# Patient Record
Sex: MALE | Race: WHITE
[De-identification: names, ages, dates, MRNs, and addresses within clinical notes are randomized per-mention and may not be internally consistent; named-entity substitution may affect disease eponyms.]

---

## 2017-11-27 ENCOUNTER — HOSPITAL ENCOUNTER (INPATIENT)
Dept: HOSPITAL 61 - ER | Age: 32
LOS: 2 days | Discharge: HOME | DRG: 872 | End: 2017-11-29
Attending: INTERNAL MEDICINE | Admitting: INTERNAL MEDICINE
Payer: SELF-PAY

## 2017-11-27 VITALS — BODY MASS INDEX: 25.11 KG/M2 | WEIGHT: 169.56 LBS | HEIGHT: 69 IN

## 2017-11-27 DIAGNOSIS — E87.6: ICD-10-CM

## 2017-11-27 DIAGNOSIS — A41.9: Primary | ICD-10-CM

## 2017-11-27 DIAGNOSIS — Z87.891: ICD-10-CM

## 2017-11-27 DIAGNOSIS — K35.80: ICD-10-CM

## 2017-11-27 LAB
ANION GAP SERPL CALC-SCNC: 15 MMOL/L (ref 6–14)
BASOPHILS # BLD AUTO: 0.1 X10^3/UL (ref 0–0.2)
BASOPHILS NFR BLD: 0 % (ref 0–3)
BUN SERPL-MCNC: 13 MG/DL (ref 8–26)
CALCIUM SERPL-MCNC: 9.4 MG/DL (ref 8.5–10.1)
CHLORIDE SERPL-SCNC: 100 MMOL/L (ref 98–107)
CO2 SERPL-SCNC: 24 MMOL/L (ref 21–32)
CREAT SERPL-MCNC: 1.3 MG/DL (ref 0.7–1.3)
EOSINOPHIL NFR BLD AUTO: 1 % (ref 0–5)
EOSINOPHIL NFR BLD: 1 % (ref 0–3)
ERYTHROCYTE [DISTWIDTH] IN BLOOD BY AUTOMATED COUNT: 13.6 % (ref 11.5–14.5)
GFR SERPLBLD BASED ON 1.73 SQ M-ARVRAT: 64 ML/MIN
GLUCOSE SERPL-MCNC: 151 MG/DL (ref 70–99)
HCT VFR BLD CALC: 45 % (ref 39–53)
HGB BLD-MCNC: 14.5 G/DL (ref 13–17.5)
LYMPHOCYTES # BLD: 4.2 X10^3/UL (ref 1–4.8)
LYMPHOCYTES NFR BLD AUTO: 17 % (ref 24–48)
MCH RBC QN AUTO: 28 PG (ref 25–35)
MCHC RBC AUTO-ENTMCNC: 32 G/DL (ref 31–37)
MCV RBC AUTO: 88 FL (ref 79–100)
MONOCYTES NFR BLD: 8 % (ref 0–9)
NEUTROPHILS NFR BLD AUTO: 75 % (ref 31–73)
PLATELET # BLD AUTO: 333 X10^3/UL (ref 140–400)
PLATELET # BLD EST: ADEQUATE 10*3/UL
POTASSIUM SERPL-SCNC: 3.1 MMOL/L (ref 3.5–5.1)
RBC # BLD AUTO: 5.12 X10^6/UL (ref 4.3–5.7)
SODIUM SERPL-SCNC: 139 MMOL/L (ref 136–145)
WBC # BLD AUTO: 24.9 X10^3/UL (ref 4–11)

## 2017-11-27 PROCEDURE — S0028 INJECTION, FAMOTIDINE, 20 MG: HCPCS

## 2017-11-27 PROCEDURE — 85007 BL SMEAR W/DIFF WBC COUNT: CPT

## 2017-11-27 PROCEDURE — 36415 COLL VENOUS BLD VENIPUNCTURE: CPT

## 2017-11-27 PROCEDURE — 81001 URINALYSIS AUTO W/SCOPE: CPT

## 2017-11-27 PROCEDURE — 96375 TX/PRO/DX INJ NEW DRUG ADDON: CPT

## 2017-11-27 PROCEDURE — 85025 COMPLETE CBC W/AUTO DIFF WBC: CPT

## 2017-11-27 PROCEDURE — 80076 HEPATIC FUNCTION PANEL: CPT

## 2017-11-27 PROCEDURE — 83690 ASSAY OF LIPASE: CPT

## 2017-11-27 PROCEDURE — 76705 ECHO EXAM OF ABDOMEN: CPT

## 2017-11-27 PROCEDURE — 80048 BASIC METABOLIC PNL TOTAL CA: CPT

## 2017-11-27 PROCEDURE — 96376 TX/PRO/DX INJ SAME DRUG ADON: CPT

## 2017-11-27 PROCEDURE — 83735 ASSAY OF MAGNESIUM: CPT

## 2017-11-27 PROCEDURE — 96361 HYDRATE IV INFUSION ADD-ON: CPT

## 2017-11-27 PROCEDURE — 74177 CT ABD & PELVIS W/CONTRAST: CPT

## 2017-11-27 PROCEDURE — 96374 THER/PROPH/DIAG INJ IV PUSH: CPT

## 2017-11-27 NOTE — PHYS DOC
Past Medical History


Past Medical History:  No Pertinent History


Past Surgical History:  No Surgical History


Smoking:  Cigarettes


Alcohol Use:  None





Adult General


Chief Complaint


Chief Complaint:  ABDOMINAL PAIN





HPI


HPI





32-year-old otherwise healthy male presenting to the emergency department today 

with nausea vomiting and epigastric abdominal pain. His been present for the 

past 24-48 hours. His vomitus is stomach contents. It is nonbilious and 

nonbloody. He denies fevers or chills. He denies it migrating pain. He denies 

diarrhea or constipation. The pain is mild intermittent and without alleviating 

factors.





Review of systems is negative for chest pain shortness of breath cough fevers 

chills headache neck stiffness confusion. All other review of systems is 

negative unless otherwise noted in history of present illness.





ED course: 32-year-old male presenting with epigastric abdominal pain. Upon 

arrival the patient's heart rate is normal. He is afebrile. On examination of 

the patient he has a soft nontender abdomen. Negative Henderson sign. The patient 

did vomit in the emergency department which was nonbloody. IV fluids yesterday 

along with nausea and pain medication. Blood work obtained. Both work shows 

significant leukocytosis. Ultrasound of the right upper quadrant negative. CT 

the abdomen pelvis obtained which concerning for possible early appendicitis. 

Patient was placed nothing by mouth, IV fluids administered along with abx, 

surgical consult placed and the patient was admitted to our hospital for 

further evaluation workup and care to Dr. schwarz. I have assessed this patient 

clinically and believe that their condition requires an admission to the 

hospital.  After consulting the admitting physician about this case, they have 

asked that I admit this patient to their service as an inpatient based on the 

clinical presentation and my impression.





Review of Systems


Review of Systems


SEE ABOVE.





Current Medications


Current Medications





Current Medications








 Medications


  (Trade)  Dose


 Ordered  Sig/Perla  Start Time


 Stop Time Status Last Admin


Dose Admin


 


 Famotidine


  (Pepcid Vial)  20 mg  1X  ONCE  11/27/17 23:45


 11/27/17 23:46 DC 11/27/17 23:46


20 MG


 


 Hydromorphone HCl


  (Dilaudid)  0.5 mg  PRN Q30MIN  PRN  11/28/17 01:00


    11/28/17 01:22


0.5 MG


 


 Info


  (Do NOT chart on


 this entry -- for


 MONITORING)  1 each  PRN DAILY  PRN  11/28/17 01:00


 11/30/17 00:59   


 


 


 Iohexol


  (Omnipaque 300


 Mg/ml)  75 ml  1X  ONCE  11/28/17 01:30


 11/28/17 01:31 DC 11/28/17 01:07


75 ML


 


 Metoclopramide HCl


  (Reglan Vial)  10 mg  1X  ONCE  11/28/17 01:30


 11/28/17 01:31 DC 11/28/17 01:21


10 MG


 


 Ondansetron HCl


  (Zofran)  4 mg  1X  ONCE  11/27/17 23:45


 11/27/17 23:46 DC 11/27/17 23:46


4 MG


 


 Sodium Chloride  1,000 ml @ 


 1,000 mls/hr  1X  ONCE  11/27/17 23:45


 11/28/17 00:44 DC 11/27/17 23:46


1,000 MLS/HR











Allergies


Allergies





Allergies








Coded Allergies Type Severity Reaction Last Updated Verified


 


  No Known Drug Allergies    11/27/17 No











Physical Exam


Physical Exam


SEE ABOVE





Constitutional: Well developed, well nourished, no acute distress, non-toxic 

appearance. []


HENT: Normocephalic, atraumatic, bilateral external ears normal, oropharynx 

moist, no oral exudates, nose normal. 


Eyes: PERRLA, EOMI, conjunctiva normal, no discharge. [] 


Neck: Normal range of motion, no tenderness, supple, no stridor. [] 


Cardiovascular:Heart rate regular rhythm, no murmur 


Lungs & Thorax:  Bilateral breath sounds clear to auscultation []


Abdomen: SEE ABOVE


Skin: Warm, dry, no erythema, no rash. [] 


Back: No tenderness, no CVA tenderness. [] 


Extremities: No tenderness, no cyanosis, no clubbing, ROM intact, no edema.  


Neurologic: Alert and oriented X 3, normal motor function, normal sensory 

function, no focal deficits noted. []


Psychologic: Affect normal, judgement normal, mood normal.





Current Patient Data


Vital Signs





 Vital Signs








  Date Time  Temp Pulse Resp B/P (MAP) Pulse Ox O2 Delivery O2 Flow Rate FiO2


 


11/28/17 02:20  57 20 140/73 (95) 99 Room Air  


 


11/27/17 23:04 97.6       





 97.6       








Lab Values





 Laboratory Tests








Test


  11/27/17


23:15 11/27/17


23:35 11/28/17


00:45


 


White Blood Count


  24.9 x10^3/uL


(4.0-11.0)  H 


  


 


 


Red Blood Count


  5.12 x10^6/uL


(4.30-5.70) 


  


 


 


Hemoglobin


  14.5 g/dL


(13.0-17.5) 


  


 


 


Hematocrit


  45.0 %


(39.0-53.0) 


  


 


 


Mean Corpuscular Volume


  88 fL ()


  


  


 


 


Mean Corpuscular Hemoglobin 28 pg (25-35)    


 


Mean Corpuscular Hemoglobin


Concent 32 g/dL


(31-37) 


  


 


 


Red Cell Distribution Width


  13.6 %


(11.5-14.5) 


  


 


 


Platelet Count


  333 x10^3/uL


(140-400) 


  


 


 


Neutrophils (%) (Auto) 75 % (31-73)  H  


 


Lymphocytes (%) (Auto) 17 % (24-48)  L  


 


Monocytes (%) (Auto) 8 % (0-9)    


 


Eosinophils (%) (Auto) 1 % (0-3)    


 


Basophils (%) (Auto) 0 % (0-3)    


 


Neutrophils # (Auto)


  18.6 x10^3uL


(1.8-7.7)  H 


  


 


 


Lymphocytes # (Auto)


  4.2 x10^3/uL


(1.0-4.8) 


  


 


 


Monocytes # (Auto)


  2.0 x10^3/uL


(0.0-1.1)  H 


  


 


 


Eosinophils # (Auto)


  0.1 x10^3/uL


(0.0-0.7) 


  


 


 


Basophils # (Auto)


  0.1 x10^3/uL


(0.0-0.2) 


  


 


 


Segmented Neutrophils % 78 % (35-66)  H  


 


Band Neutrophils % 1 % (0-9)    


 


Lymphocytes % 14 % (24-48)  L  


 


Monocytes % 6 % (0-10)    


 


Eosinophils % 1 % (0-5)    


 


Platelet Estimate


  Adequate


(ADEQUATE) 


  


 


 


Sodium Level


  


  139 mmol/L


(136-145) 


 


 


Potassium Level


  


  3.1 mmol/L


(3.5-5.1)  L 


 


 


Chloride Level


  


  100 mmol/L


() 


 


 


Carbon Dioxide Level


  


  24 mmol/L


(21-32) 


 


 


Anion Gap  15 (6-14)  H 


 


Blood Urea Nitrogen


  


  13 mg/dL


(8-26) 


 


 


Creatinine


  


  1.3 mg/dL


(0.7-1.3) 


 


 


Estimated GFR


(Cockcroft-Gault) 


  64.0  


  


 


 


Glucose Level


  


  151 mg/dL


(70-99)  H 


 


 


Calcium Level


  


  9.4 mg/dL


(8.5-10.1) 


 


 


Total Bilirubin


  


  0.2 mg/dL


(0.2-1.0) 


 


 


Direct Bilirubin


  


  0.1 mg/dL


(0.0-0.2) 


 


 


Aspartate Amino Transferase


(AST) 


  23 U/L (15-37)


  


 


 


Alanine Aminotransferase (ALT)


  


  40 U/L (16-63)


  


 


 


Alkaline Phosphatase


  


  60 U/L


() 


 


 


Total Protein


  


  7.9 g/dL


(6.4-8.2) 


 


 


Albumin


  


  4.2 g/dL


(3.4-5.0) 


 


 


Lipase


  


  109 U/L


() 


 


 


Urine Collection Type   Unknown  


 


Urine Color   Yellow  


 


Urine Clarity   Clear  


 


Urine pH   8.5  


 


Urine Specific Gravity   1.020  


 


Urine Protein


  


  


  Negative mg/dL


(NEG-TRACE)


 


Urine Glucose (UA)


  


  


  Negative mg/dL


(NEG)


 


Urine Ketones (Stick)


  


  


  Trace mg/dL


(NEG)


 


Urine Blood


  


  


  Negative (NEG)


 


 


Urine Nitrite


  


  


  Negative (NEG)


 


 


Urine Bilirubin


  


  


  Negative (NEG)


 


 


Urine Urobilinogen Dipstick


  


  


  0.2 mg/dL (0.2


mg/dL)


 


Urine Leukocyte Esterase


  


  


  Negative (NEG)


 


 


Urine RBC


  


  


  Occ /HPF (0-2)


 


 


Urine WBC


  


  


  Occ /HPF (0-4)


 


 


Urine Squamous Epithelial


Cells 


  


  Occ /LPF  


 


 


Urine Bacteria


  


  


  0 /HPF (0-FEW)


 


 


Urine Mucus   Slight /LPF  





 Laboratory Tests


11/27/17 23:15








 Laboratory Tests


11/27/17 23:35











EKG


EKG


[]





Radiology/Procedures


Radiology/Procedures


[]





Course & Med Decision Making


Course & Med Decision Making


Pertinent Labs and Imaging studies reviewed. (See chart for details)





[]





Dragon Disclaimer


Dragon Disclaimer


This electronic medical record was generated, in whole or in part, using a 

voice recognition dictation system.





Departure


Departure


Impression:  


 Primary Impression:  


 Abdominal pain


 Additional Impression:  


 Appendicitis


Disposition:  09 ADMITTED AS INPATIENT


Admitting Physician:  Cate Schwarz


Condition:  STABLE


Referrals:  


NO PCP (PCP)





Problem Qualifiers











CLEO CARIAS MD Nov 27, 2017 23:29

## 2017-11-28 VITALS — DIASTOLIC BLOOD PRESSURE: 62 MMHG | SYSTOLIC BLOOD PRESSURE: 111 MMHG

## 2017-11-28 VITALS — DIASTOLIC BLOOD PRESSURE: 34 MMHG | SYSTOLIC BLOOD PRESSURE: 107 MMHG

## 2017-11-28 VITALS — DIASTOLIC BLOOD PRESSURE: 64 MMHG | SYSTOLIC BLOOD PRESSURE: 110 MMHG

## 2017-11-28 VITALS — DIASTOLIC BLOOD PRESSURE: 62 MMHG | SYSTOLIC BLOOD PRESSURE: 114 MMHG

## 2017-11-28 VITALS — SYSTOLIC BLOOD PRESSURE: 104 MMHG | DIASTOLIC BLOOD PRESSURE: 60 MMHG

## 2017-11-28 VITALS — SYSTOLIC BLOOD PRESSURE: 131 MMHG | DIASTOLIC BLOOD PRESSURE: 70 MMHG

## 2017-11-28 LAB
ALBUMIN SERPL-MCNC: 4.2 G/DL (ref 3.4–5)
ALP SERPL-CCNC: 60 U/L (ref 46–116)
ALT SERPL-CCNC: 40 U/L (ref 16–63)
AST SERPL-CCNC: 23 U/L (ref 15–37)
BACTERIA #/AREA URNS HPF: 0 /HPF
BASOPHILS # BLD AUTO: 0.1 X10^3/UL (ref 0–0.2)
BASOPHILS NFR BLD: 0 % (ref 0–3)
BILIRUB DIRECT SERPL-MCNC: 0.1 MG/DL (ref 0–0.2)
BILIRUB SERPL-MCNC: 0.2 MG/DL (ref 0.2–1)
BILIRUB UR QL STRIP: NEGATIVE
EOSINOPHIL NFR BLD: 0 % (ref 0–3)
ERYTHROCYTE [DISTWIDTH] IN BLOOD BY AUTOMATED COUNT: 13.8 % (ref 11.5–14.5)
GLUCOSE UR STRIP-MCNC: NEGATIVE MG/DL
HCT VFR BLD CALC: 41.7 % (ref 39–53)
HGB BLD-MCNC: 13.5 G/DL (ref 13–17.5)
LYMPHOCYTES # BLD: 1.9 X10^3/UL (ref 1–4.8)
LYMPHOCYTES NFR BLD AUTO: 8 % (ref 24–48)
MCH RBC QN AUTO: 28 PG (ref 25–35)
MCHC RBC AUTO-ENTMCNC: 32 G/DL (ref 31–37)
MCV RBC AUTO: 88 FL (ref 79–100)
MONOCYTES NFR BLD: 7 % (ref 0–9)
NEUTROPHILS NFR BLD AUTO: 84 % (ref 31–73)
NITRITE UR QL STRIP: NEGATIVE
PH UR STRIP: 8.5 [PH]
PLATELET # BLD AUTO: 312 X10^3/UL (ref 140–400)
PROT SERPL-MCNC: 7.9 G/DL (ref 6.4–8.2)
PROT UR STRIP-MCNC: NEGATIVE MG/DL
RBC # BLD AUTO: 4.75 X10^6/UL (ref 4.3–5.7)
RBC #/AREA URNS HPF: (no result) /HPF (ref 0–2)
SP GR UR STRIP: 1.02
SQUAMOUS #/AREA URNS LPF: (no result) /LPF
UROBILINOGEN UR-MCNC: 0.2 MG/DL
WBC # BLD AUTO: 22.6 X10^3/UL (ref 4–11)
WBC #/AREA URNS HPF: (no result) /HPF (ref 0–4)

## 2017-11-28 RX ADMIN — DEXTROSE, SODIUM CHLORIDE, AND POTASSIUM CHLORIDE SCH MLS/HR: 5; .45; .22 INJECTION INTRAVENOUS at 08:19

## 2017-11-28 RX ADMIN — PIPERACILLIN SODIUM AND TAZOBACTAM SODIUM SCH GM: 3; .375 INJECTION, POWDER, FOR SOLUTION INTRAVENOUS at 05:40

## 2017-11-28 RX ADMIN — PIPERACILLIN SODIUM AND TAZOBACTAM SODIUM SCH GM: 3; .375 INJECTION, POWDER, FOR SOLUTION INTRAVENOUS at 16:55

## 2017-11-28 RX ADMIN — PIPERACILLIN SODIUM AND TAZOBACTAM SODIUM SCH GM: 3; .375 INJECTION, POWDER, FOR SOLUTION INTRAVENOUS at 03:32

## 2017-11-28 RX ADMIN — PIPERACILLIN SODIUM AND TAZOBACTAM SODIUM SCH GM: 3; .375 INJECTION, POWDER, FOR SOLUTION INTRAVENOUS at 11:50

## 2017-11-28 RX ADMIN — DEXTROSE, SODIUM CHLORIDE, AND POTASSIUM CHLORIDE SCH MLS/HR: 5; .45; .22 INJECTION INTRAVENOUS at 16:55

## 2017-11-28 NOTE — RAD
INDICATION: epigastric pain, leukocytosis eval appendix<BR>75ML OMNI 300

 

COMPARISON: None.

 

TECHNIQUE: 

 

Axial CT images were obtained through the abdomen and pelvis with 

intravenous contrast.  

 

One or more of the following individualized dose reduction techniques were

utilized for this examination:  1. Automated exposure control;  2. 

Adjustment of the mA and/or kV according to patient size;  3. Use of 

iterative reconstruction technique.

 

FINDINGS:

 

Abdominal aorta is not aneurysmal.

Urinary bladder is partially distended.

No intrahepatic bile duct dilation.

No peripancreatic edema.

Spleen unremarkable.

No hydronephrosis.

Subcentimeter low-attenuation left renal lesion. Most commonly from tiny 

cyst.

Small fat-containing umbilical hernia.

There is a retrocecal appendix. The appendix measures up to approximately 

9 mm. There is some mucosal enhancement there may be some very mild 

adjacent stranding.

No dilated loops of bowel to suggest obstruction.

 

IMPRESSION:

 

1. There is a suspected retrocecal appendix which is mildly dilated with 

some suspected mild adjacent edema to the fat. This can be seen with an 

early acute appendicitis in the correct clinical context.

 

Electronically signed by: Gil Cotto MD (11/28/2017 2:36 AM) [id number]

## 2017-11-28 NOTE — RAD
INDICATION : RUQ PAIN VOMITING

 

COMPARISON: None

 

TECHNIQUE: Multiple ultrasound images obtained through the abdomen in 

grayscale and color.

 

FINDINGS:

 

Liver: Echotexture within normal limits in visualized portions of liver.

Gallbladder: No wall thickening or definite stones. Somewhat limited exam.

IVC: Partially distended at level of liver.

Common Bile Duct: Not dilated.

Pancreas: Poorly seen

Right Kidney:  No hydronephrosis.

 

IMPRESSION:

 

1. The common bile duct does not appear dilated and no right-sided 

hydronephrosis.

 

Electronically signed by: Gil Cotto MD (11/28/2017 12:44 AM) 

[id number]

## 2017-11-28 NOTE — PDOC1
History and Physical


Date of Admission


Date of Admission


DATE: 11/28/17 


TIME: 08:04





Identification/Chief Complaint


Chief Complaint


abdominal pain


Problems:  





Source


Source:  Chart review, Patient





History of Present Illness


History of Present Illness








Mr. Reynaga,  is a 32-year-old otherwise healthy male.


He wa admitted last night 0300 for sudden onset severe abdominal pain,  lower 

mid abdominal pain with nausea and vomiting.  He motions that he has severe 

epigastric abdominal pain, but that is "moved lower".  the pain had worsened 

over 24 hours, but now feels much better this AM,  fluid and abx given.


he had vomited, but just has dry mouth this AM. 


 white count, but afebrile,





Past Medical History


Cardiovascular:  No pertinent hx


Pulmonary:  No pertinent hx


GI:  No pertinent hx


Heme/Onc:  No pertinent hx, B12 deficiency


Psych:  No pertinent hx


Rheumatologic:  No pertinent hx


Infectious disease:  No pertinent hx





Past Surgical History


Past Surgical History:  No pertinent history





Family History


Family History


"everyone is healthy"





Social History


Smoke:  No


ALCOHOL:  none


Drugs:  None





Current Problem List


Problem List


Problems


Medical Problems:


(1) Abdominal pain


Status: Acute  





(2) Appendicitis


Status: Acute  








Problems:  





Current Medications


Current Medications





Current Medications


Sodium Chloride 1,000 ml @  1,000 mls/hr 1X  ONCE IV  Last administered on 11/27 /17at 23:45;  Start 11/27/17 at 23:45;  Stop 11/28/17 at 00:44;  Status DC


Ondansetron HCl (Zofran) 4 mg 1X  ONCE IV  Last administered on 11/27/17at 23:46

;  Start 11/27/17 at 23:45;  Stop 11/27/17 at 23:46;  Status DC


Hydromorphone HCl (Dilaudid) 0.5 mg 1X  ONCE IV  Last administered on 11/27/ 17at 23:47;  Start 11/27/17 at 23:45;  Stop 11/27/17 at 23:46;  Status DC


Sodium Chloride 1,000 ml @  1,000 mls/hr 1X  ONCE IV  Last administered on 11/27 /17at 23:46;  Start 11/27/17 at 23:45;  Stop 11/28/17 at 00:44;  Status DC


Famotidine (Pepcid Vial) 20 mg 1X  ONCE IVP  Last administered on 11/27/17at 23:

46;  Start 11/27/17 at 23:45;  Stop 11/27/17 at 23:46;  Status DC


Iohexol (Omnipaque 300 Mg/ml) 75 ml 1X  ONCE IV  Last administered on 11/28/ 17at 01:07;  Start 11/28/17 at 01:30;  Stop 11/28/17 at 01:31;  Status DC


Info (Do NOT chart on this entry -- for MONITORING) 1 each PRN DAILY  PRN [facility] 

SEE COMMENTS;  Start 11/28/17 at 01:00;  Stop 11/30/17 at 00:59


Hydromorphone HCl (Dilaudid) 0.5 mg PRN Q30MIN  PRN IV SEVERE PAIN Last 

administered on 11/28/17at 01:22;  Start 11/28/17 at 01:00


Metoclopramide HCl (Reglan Vial) 10 mg 1X  ONCE IV  Last administered on 11/28/ 17at 01:21;  Start 11/28/17 at 01:30;  Stop 11/28/17 at 01:31;  Status DC


Piperacillin Sod/ Tazobactam Sod (Zosyn Per Pharmacy) 1 each PRN DAILY  PRN [facility] 

SEE COMMENTS;  Start 11/28/17 at 03:00


Ondansetron HCl (Zofran) 4 mg PRN Q8HRS  PRN IV NAUSEA/VOMITING;  Start 11/28/ 17 at 03:00;  Stop 11/29/17 at 02:59


Morphine Sulfate 2 mg PRN Q2HR  PRN IV SEVERE PAIN Last administered on 11/28/ 17at 06:09;  Start 11/28/17 at 03:00;  Stop 11/28/17 at 07:56;  Status DC


Sodium Chloride 1,000 ml @  100 mls/hr Q10H IV  Last administered on 11/28/17at 

03:32;  Start 11/28/17 at 03:00;  Stop 11/28/17 at 07:56;  Status DC


Piperacillin Sod/ Tazobactam Sod (Zosyn) 3.375 gm Q6HRS IVP  Last administered 

on 11/28/17at 05:40;  Start 11/28/17 at 03:00


Potassium Chloride/Dextrose/ Sod Cl 1,000 ml @  100 mls/hr Q10H IV ;  Start 11/ 28/17 at 08:00


Morphine Sulfate 4 mg PRN Q2HR  PRN IV PAIN;  Start 11/28/17 at 08:00





Allergies


Allergies:  


Coded Allergies:  


     No Known Drug Allergies (Unverified , 11/27/17)





ROS


Review of System


he works at [employer],  has 3 children, not 


General:  No: Chills, Night Sweats, Fatigue, Malaise, Appetite, Other


PSYCHOLOGICAL ROS:  No: Anxiety, Behavioral Disorder, Concentration difficultie

, Decreased libido, Depression, Disorientation, Hallucinations, Hostility, 

Irritablity, Memory difficulties, Mood Swings, Obsessive thoughts, Other


Eyes:  No Blurry vision, No Decreased vision, No Double vision, No Dry eyes, No 

Excessive tearing, No Eye Pain, No Itchy Eyes, No Loss of vision, No Photophobia

, No Scotomata, No Uses contacts, No Uses glasses, No Other


HEENT:  No: Heacaches, Visual Changes, Hearing change, Nasal congestion, Nasal 

discharge, Oral lesions, Sinus pain, Sore Throat, Epistaxis, Sneezing, Snoring, 

Tinnitus, Vertigo, Vocal changes, Other


Hematological and Lymphatic:  No: Bleeding Problems, Blood Clots, Blood 

Transfusions, Brusing, Night Sweats, Pallor, Swollen Lymph Nodes, Other


Respiratory:  No: Cough, Hemoptysis, Orthopnea, Pleuritic Pain, Shortness of 

breath, SOB with excertion, Sputum Changes, Stridor, Tachypnea, Wheezing, Other


Cardiovascular:  No Chest Pain, No Palpitations, No Orthopnea, No Paroxysmal 

Noc. Dyspnea, No Edema, No Lt Headedness, No Other


Gastrointestinal:  Yes Nausea, Yes Vomiting, Yes Abdominal Pain, 


   No Diarrhea, No Constipation, No Melena, No Hematochezia, No Other


Genitourinary:  No Dysuria, No Frequency, No Incontinence, No Hematuria, No 

Retention, No Discharge, No Urgency, No Pain, No Flank Pain, No Other, No , No 

, No , No , No , No , No 


Musculoskeletal:  No Gait Disturbance, No Joint Pain, No Joint Stiffness, No 

Joint Swelling, No Muscle Pain, No Muscular Weakness, No Pain In:, No Swelling 

In:, No Other


Neurological:  No Behavorial Changes, No Bowel/Bladder ControlChng, No Confusion

, No Dizziness, No Gait Disturbance, No Headaches, No Impaired Coord/balance, 

No Memory Loss, No Numbness/Tingling, No Seizures, No Speech Problems, No 

Tremors, No Visual Changes, No Weakness, No Other


Skin:  No Eczema, No Hair Changes, No Lumps, No Mole Changes, No Mottling, No 

Nail Changes, No Pruritus, No Rash, No Skin Lesion Changes, No Other, No Acne





Physical Exam


General:  Alert, Oriented X3, Cooperative, No acute distress


HEENT:  Atraumatic, PERRLA, EOMI, Mucous membr. moist/pink


Lungs:  Clear to auscultation, Normal air movement


Heart:  S1S2, no murmurs


Abdomen:  Normal bowel sounds, Soft


Extremities:  No clubbing, No edema


Skin:  No breakdown, No significant lesion, Other (arm and hand tattoos)


Neuro:  Normal speech, Sensation intact, Cranial nerves 3-12 NL


Psych/Mental Status:  Mood NL





Vitals


Vitals





Vital Signs








  Date Time  Temp Pulse Resp B/P (MAP) Pulse Ox O2 Delivery O2 Flow Rate FiO2


 


11/28/17 07:26      Room Air  


 


11/28/17 06:09   18  99   


 


11/28/17 05:05 98.5 59  111/62 (78)    





 98.5       











Labs


Labs





Laboratory Tests








Test


  11/27/17


23:15 11/27/17


23:35 11/28/17


00:45


 


White Blood Count


  24.9 x10^3/uL


(4.0-11.0) 


  


 


 


Red Blood Count


  5.12 x10^6/uL


(4.30-5.70) 


  


 


 


Hemoglobin


  14.5 g/dL


(13.0-17.5) 


  


 


 


Hematocrit


  45.0 %


(39.0-53.0) 


  


 


 


Mean Corpuscular Volume 88 fL ()   


 


Mean Corpuscular Hemoglobin 28 pg (25-35)   


 


Mean Corpuscular Hemoglobin


Concent 32 g/dL


(31-37) 


  


 


 


Red Cell Distribution Width


  13.6 %


(11.5-14.5) 


  


 


 


Platelet Count


  333 x10^3/uL


(140-400) 


  


 


 


Neutrophils (%) (Auto) 75 % (31-73)   


 


Lymphocytes (%) (Auto) 17 % (24-48)   


 


Monocytes (%) (Auto) 8 % (0-9)   


 


Eosinophils (%) (Auto) 1 % (0-3)   


 


Basophils (%) (Auto) 0 % (0-3)   


 


Neutrophils # (Auto)


  18.6 x10^3uL


(1.8-7.7) 


  


 


 


Lymphocytes # (Auto)


  4.2 x10^3/uL


(1.0-4.8) 


  


 


 


Monocytes # (Auto)


  2.0 x10^3/uL


(0.0-1.1) 


  


 


 


Eosinophils # (Auto)


  0.1 x10^3/uL


(0.0-0.7) 


  


 


 


Basophils # (Auto)


  0.1 x10^3/uL


(0.0-0.2) 


  


 


 


Segmented Neutrophils % 78 % (35-66)   


 


Band Neutrophils % 1 % (0-9)   


 


Lymphocytes % 14 % (24-48)   


 


Monocytes % 6 % (0-10)   


 


Eosinophils % 1 % (0-5)   


 


Platelet Estimate


  Adequate


(ADEQUATE) 


  


 


 


Sodium Level


  


  139 mmol/L


(136-145) 


 


 


Potassium Level


  


  3.1 mmol/L


(3.5-5.1) 


 


 


Chloride Level


  


  100 mmol/L


() 


 


 


Carbon Dioxide Level


  


  24 mmol/L


(21-32) 


 


 


Anion Gap  15 (6-14)  


 


Blood Urea Nitrogen


  


  13 mg/dL


(8-26) 


 


 


Creatinine


  


  1.3 mg/dL


(0.7-1.3) 


 


 


Estimated GFR


(Cockcroft-Gault) 


  64.0 


  


 


 


Glucose Level


  


  151 mg/dL


(70-99) 


 


 


Calcium Level


  


  9.4 mg/dL


(8.5-10.1) 


 


 


Total Bilirubin


  


  0.2 mg/dL


(0.2-1.0) 


 


 


Direct Bilirubin


  


  0.1 mg/dL


(0.0-0.2) 


 


 


Aspartate Amino Transf


(AST/SGOT) 


  23 U/L (15-37) 


  


 


 


Alanine Aminotransferase


(ALT/SGPT) 


  40 U/L (16-63) 


  


 


 


Alkaline Phosphatase


  


  60 U/L


() 


 


 


Total Protein


  


  7.9 g/dL


(6.4-8.2) 


 


 


Albumin


  


  4.2 g/dL


(3.4-5.0) 


 


 


Lipase


  


  109 U/L


() 


 


 


Urine Collection Type   Unknown 


 


Urine Color   Yellow 


 


Urine Clarity   Clear 


 


Urine pH   8.5 


 


Urine Specific Gravity   1.020 


 


Urine Protein


  


  


  Negative mg/dL


(NEG-TRACE)


 


Urine Glucose (UA)


  


  


  Negative mg/dL


(NEG)


 


Urine Ketones (Stick)


  


  


  Trace mg/dL


(NEG)


 


Urine Blood   Negative (NEG) 


 


Urine Nitrite   Negative (NEG) 


 


Urine Bilirubin   Negative (NEG) 


 


Urine Urobilinogen Dipstick


  


  


  0.2 mg/dL (0.2


mg/dL)


 


Urine Leukocyte Esterase   Negative (NEG) 


 


Urine RBC   Occ /HPF (0-2) 


 


Urine WBC   Occ /HPF (0-4) 


 


Urine Squamous Epithelial


Cells 


  


  Occ /LPF 


 


 


Urine Bacteria   0 /HPF (0-FEW) 


 


Urine Mucus   Slight /LPF 








Laboratory Tests








Test


  11/27/17


23:15 11/27/17


23:35 11/28/17


00:45


 


White Blood Count


  24.9 x10^3/uL


(4.0-11.0) 


  


 


 


Red Blood Count


  5.12 x10^6/uL


(4.30-5.70) 


  


 


 


Hemoglobin


  14.5 g/dL


(13.0-17.5) 


  


 


 


Hematocrit


  45.0 %


(39.0-53.0) 


  


 


 


Mean Corpuscular Volume 88 fL ()   


 


Mean Corpuscular Hemoglobin 28 pg (25-35)   


 


Mean Corpuscular Hemoglobin


Concent 32 g/dL


(31-37) 


  


 


 


Red Cell Distribution Width


  13.6 %


(11.5-14.5) 


  


 


 


Platelet Count


  333 x10^3/uL


(140-400) 


  


 


 


Neutrophils (%) (Auto) 75 % (31-73)   


 


Lymphocytes (%) (Auto) 17 % (24-48)   


 


Monocytes (%) (Auto) 8 % (0-9)   


 


Eosinophils (%) (Auto) 1 % (0-3)   


 


Basophils (%) (Auto) 0 % (0-3)   


 


Neutrophils # (Auto)


  18.6 x10^3uL


(1.8-7.7) 


  


 


 


Lymphocytes # (Auto)


  4.2 x10^3/uL


(1.0-4.8) 


  


 


 


Monocytes # (Auto)


  2.0 x10^3/uL


(0.0-1.1) 


  


 


 


Eosinophils # (Auto)


  0.1 x10^3/uL


(0.0-0.7) 


  


 


 


Basophils # (Auto)


  0.1 x10^3/uL


(0.0-0.2) 


  


 


 


Segmented Neutrophils % 78 % (35-66)   


 


Band Neutrophils % 1 % (0-9)   


 


Lymphocytes % 14 % (24-48)   


 


Monocytes % 6 % (0-10)   


 


Eosinophils % 1 % (0-5)   


 


Platelet Estimate


  Adequate


(ADEQUATE) 


  


 


 


Sodium Level


  


  139 mmol/L


(136-145) 


 


 


Potassium Level


  


  3.1 mmol/L


(3.5-5.1) 


 


 


Chloride Level


  


  100 mmol/L


() 


 


 


Carbon Dioxide Level


  


  24 mmol/L


(21-32) 


 


 


Anion Gap  15 (6-14)  


 


Blood Urea Nitrogen


  


  13 mg/dL


(8-26) 


 


 


Creatinine


  


  1.3 mg/dL


(0.7-1.3) 


 


 


Estimated GFR


(Cockcroft-Gault) 


  64.0 


  


 


 


Glucose Level


  


  151 mg/dL


(70-99) 


 


 


Calcium Level


  


  9.4 mg/dL


(8.5-10.1) 


 


 


Total Bilirubin


  


  0.2 mg/dL


(0.2-1.0) 


 


 


Direct Bilirubin


  


  0.1 mg/dL


(0.0-0.2) 


 


 


Aspartate Amino Transf


(AST/SGOT) 


  23 U/L (15-37) 


  


 


 


Alanine Aminotransferase


(ALT/SGPT) 


  40 U/L (16-63) 


  


 


 


Alkaline Phosphatase


  


  60 U/L


() 


 


 


Total Protein


  


  7.9 g/dL


(6.4-8.2) 


 


 


Albumin


  


  4.2 g/dL


(3.4-5.0) 


 


 


Lipase


  


  109 U/L


() 


 


 


Urine Collection Type   Unknown 


 


Urine Color   Yellow 


 


Urine Clarity   Clear 


 


Urine pH   8.5 


 


Urine Specific Gravity   1.020 


 


Urine Protein


  


  


  Negative mg/dL


(NEG-TRACE)


 


Urine Glucose (UA)


  


  


  Negative mg/dL


(NEG)


 


Urine Ketones (Stick)


  


  


  Trace mg/dL


(NEG)


 


Urine Blood   Negative (NEG) 


 


Urine Nitrite   Negative (NEG) 


 


Urine Bilirubin   Negative (NEG) 


 


Urine Urobilinogen Dipstick


  


  


  0.2 mg/dL (0.2


mg/dL)


 


Urine Leukocyte Esterase   Negative (NEG) 


 


Urine RBC   Occ /HPF (0-2) 


 


Urine WBC   Occ /HPF (0-4) 


 


Urine Squamous Epithelial


Cells 


  


  Occ /LPF 


 


 


Urine Bacteria   0 /HPF (0-FEW) 


 


Urine Mucus   Slight /LPF 











VTE Prophylaxis Ordered


VTE Prophylaxis Devices:  Yes


VTE Pharmacological Prophylaxi:  No





Assessment/Plan


Assessment/Plan


acute abdominal pain with nausea and vomiting





RLQ pain,  but now improved,  CT consistent with acute appendicitis





tachycardia and tachypnea and leukocytosis, sepsis, on zosyn





gen surg consult pending,  may still need appy, but pain is much improved





hypokalemia, replace IV, check mag,











SALO ALVA MD Nov 28, 2017 08:10

## 2017-11-28 NOTE — PDOC2
CONSULT


Date of Consult


Date of Consult


DATE: 11/28/17 


TIME: 12:01





History of Present Illness


Reason for Visit:


The patient is a 32 year old male who presented to the ER with primarily upper 

abdominal pain.  The pain was described as "gas pains".   The pain remained 

persistent however prompting him reporting to the ER.  The pain did not 

radiated and he denies nausea or vomiting.  Since his admission the pain has 

essentially resolved.  He no longer has any abdominal pain and he last received 

pain medication approx 4 hours prior to exam.





Past Medical History


Cardiovascular:  No pertinent hx


Pulmonary:  No pertinent hx


GI:  No pertinent hx


Heme/Onc:  No pertinent hx, B12 deficiency


Psych:  No pertinent hx


Rheumatologic:  No pertinent hx


Infectious disease:  No pertinent hx





Past Surgical History


Past Surgical History:  No pertinent history





Social History


No


ALCOHOL:  none


Drugs:  None





Current Problem List


Problem List


Problems


Medical Problems:


(1) Abdominal pain


Status: Acute  





(2) Appendicitis


Status: Acute  











Current Medications


Current Medications





Current Medications


Sodium Chloride 1,000 ml @  1,000 mls/hr 1X  ONCE IV  Last administered on 11/27 /17at 23:45;  Start 11/27/17 at 23:45;  Stop 11/28/17 at 00:44;  Status DC


Ondansetron HCl (Zofran) 4 mg 1X  ONCE IV  Last administered on 11/27/17at 23:46

;  Start 11/27/17 at 23:45;  Stop 11/27/17 at 23:46;  Status DC


Hydromorphone HCl (Dilaudid) 0.5 mg 1X  ONCE IV  Last administered on 11/27/ 17at 23:47;  Start 11/27/17 at 23:45;  Stop 11/27/17 at 23:46;  Status DC


Sodium Chloride 1,000 ml @  1,000 mls/hr 1X  ONCE IV  Last administered on 11/27 /17at 23:46;  Start 11/27/17 at 23:45;  Stop 11/28/17 at 00:44;  Status DC


Famotidine (Pepcid Vial) 20 mg 1X  ONCE IVP  Last administered on 11/27/17at 23:

46;  Start 11/27/17 at 23:45;  Stop 11/27/17 at 23:46;  Status DC


Iohexol (Omnipaque 300 Mg/ml) 75 ml 1X  ONCE IV  Last administered on 11/28/ 17at 01:07;  Start 11/28/17 at 01:30;  Stop 11/28/17 at 01:31;  Status DC


Info (Do NOT chart on this entry -- for MONITORING) 1 each PRN DAILY  PRN MC 

SEE COMMENTS;  Start 11/28/17 at 01:00;  Stop 11/30/17 at 00:59


Hydromorphone HCl (Dilaudid) 0.5 mg PRN Q30MIN  PRN IV SEVERE PAIN Last 

administered on 11/28/17at 01:22;  Start 11/28/17 at 01:00


Metoclopramide HCl (Reglan Vial) 10 mg 1X  ONCE IV  Last administered on 11/28/ 17at 01:21;  Start 11/28/17 at 01:30;  Stop 11/28/17 at 01:31;  Status DC


Piperacillin Sod/ Tazobactam Sod (Zosyn Per Pharmacy) 1 each PRN DAILY  PRN MC 

SEE COMMENTS;  Start 11/28/17 at 03:00


Ondansetron HCl (Zofran) 4 mg PRN Q8HRS  PRN IV NAUSEA/VOMITING;  Start 11/28/ 17 at 03:00;  Stop 11/29/17 at 02:59


Morphine Sulfate 2 mg PRN Q2HR  PRN IV SEVERE PAIN Last administered on 11/28/ 17at 06:09;  Start 11/28/17 at 03:00;  Stop 11/28/17 at 07:56;  Status DC


Sodium Chloride 1,000 ml @  100 mls/hr Q10H IV  Last administered on 11/28/17at 

03:32;  Start 11/28/17 at 03:00;  Stop 11/28/17 at 07:56;  Status DC


Piperacillin Sod/ Tazobactam Sod (Zosyn) 3.375 gm Q6HRS IVP  Last administered 

on 11/28/17at 11:50;  Start 11/28/17 at 03:00


Potassium Chloride/Dextrose/ Sod Cl 1,000 ml @  100 mls/hr Q10H IV  Last 

administered on 11/28/17at 08:19;  Start 11/28/17 at 08:00


Morphine Sulfate 4 mg PRN Q2HR  PRN IV PAIN;  Start 11/28/17 at 08:00


Saliva Substitute (Biotene Moisturizing Mouth) 2 spray PRN Q15MIN  PRN PO DRY 

MOUTH;  Start 11/28/17 at 08:15


Famotidine (Pepcid Vial) 20 mg 1X  ONCE IVP  Last administered on 11/28/17at 08:

28;  Start 11/28/17 at 09:00;  Stop 11/28/17 at 09:01;  Status DC





Allergies


Allergies:  


Coded Allergies:  


     No Known Drug Allergies (Unverified , 11/27/17)





ROS


General:  No: Chills, Night Sweats, Fatigue, Malaise, Appetite, Other


PSYCHOLOGICAL ROS:  No: Anxiety, Behavioral Disorder, Concentration difficultie

, Decreased libido, Depression, Disorientation, Hallucinations, Hostility, 

Irritablity, Memory difficulties, Mood Swings, Obsessive thoughts, Physical 

abuse, Sexual abuse, Sleep disturbances, Suicidal ideation, Other


Eyes:  No Blurry vision, No Decreased vision, No Double vision, No Dry eyes, No 

Excessive tearing, No Eye Pain, No Itchy Eyes, No Loss of vision, No Photophobia

, No Scotomata, No Uses contacts, No Uses glasses, No Other


Hematological and Lymphatic:  No: Bleeding Problems, Blood Clots, Blood 

Transfusions, Brusing, Night Sweats, Pallor, Swollen Lymph Nodes, Other


ENDOCRINE:  No: Breast Changes, Galactorrhea, Hair Pattern Changes, Hot Flashes

, Malaise/lethargy, Mood Swings, Palpitations, Polydipsia/polyuria, Skin Changes

, Temperature Intolerance, Unexpected Weight Changes, Other


Respiratory:  No: Cough, Hemoptysis, Orthopnea, Pleuritic Pain, Shortness of 

breath, SOB with excertion, Sputum Changes, Stridor, Tachypnea, Wheezing, Other


Cardiovascular:  No Chest Pain, No Palpitations, No Orthopnea, No Paroxysmal 

Noc. Dyspnea, No Edema, No Lt Headedness, No Other


Gastrointestinal:  Yes Abdominal Pain


Genitourinary:  No Dysuria, No Frequency, No Incontinence, No Hematuria, No 

Retention, No Discharge, No Urgency, No Pain, No Flank Pain, No Other, No , No 

, No , No , No , No , No 


Musculoskeletal:  No Gait Disturbance, No Joint Pain, No Joint Stiffness, No 

Joint Swelling, No Muscle Pain, No Muscular Weakness, No Pain In:, No Swelling 

In:, No Other


Neurological:  No Behavorial Changes, No Bowel/Bladder ControlChng, No Confusion

, No Dizziness, No Gait Disturbance, No Headaches, No Impaired Coord/balance, 

No Memory Loss, No Numbness/Tingling, No Seizures, No Speech Problems, No 

Tremors, No Visual Changes, No Weakness, No Other


Skin:  No Dry Skin, No Eczema, No Hair Changes, No Lumps, No Mole Changes, No 

Mottling, No Nail Changes, No Pruritus, No Rash, No Skin Lesion Changes, No 

Other, No Acne





Physical Exam


General:  Alert, Oriented X3, Cooperative


Lungs:  Clear to auscultation


Heart:  Regular rate, Normal S1


Abdomen:  Soft, No tenderness, No masses


Extremities:  No clubbing, No cyanosis


Skin:  No rashes


Neuro:  Normal speech


Psych/Mental Status:  Mental status NL





Vitals


VITALS





Vital Signs








  Date Time  Temp Pulse Resp B/P (MAP) Pulse Ox O2 Delivery O2 Flow Rate FiO2


 


11/28/17 10:45 97.8 77 18 104/60 (75) 96 Room Air  





 97.8       











Labs


Labs





Laboratory Tests








Test


  11/27/17


23:15 11/27/17


23:35 11/28/17


00:45 11/28/17


08:40


 


White Blood Count


  24.9 x10^3/uL


(4.0-11.0) 


  


  22.6 x10^3/uL


(4.0-11.0)


 


Red Blood Count


  5.12 x10^6/uL


(4.30-5.70) 


  


  4.75 x10^6/uL


(4.30-5.70)


 


Hemoglobin


  14.5 g/dL


(13.0-17.5) 


  


  13.5 g/dL


(13.0-17.5)


 


Hematocrit


  45.0 %


(39.0-53.0) 


  


  41.7 %


(39.0-53.0)


 


Mean Corpuscular Volume 88 fL ()    88 fL () 


 


Mean Corpuscular Hemoglobin 28 pg (25-35)    28 pg (25-35) 


 


Mean Corpuscular Hemoglobin


Concent 32 g/dL


(31-37) 


  


  32 g/dL


(31-37)


 


Red Cell Distribution Width


  13.6 %


(11.5-14.5) 


  


  13.8 %


(11.5-14.5)


 


Platelet Count


  333 x10^3/uL


(140-400) 


  


  312 x10^3/uL


(140-400)


 


Neutrophils (%) (Auto) 75 % (31-73)    84 % (31-73) 


 


Lymphocytes (%) (Auto) 17 % (24-48)    8 % (24-48) 


 


Monocytes (%) (Auto) 8 % (0-9)    7 % (0-9) 


 


Eosinophils (%) (Auto) 1 % (0-3)    0 % (0-3) 


 


Basophils (%) (Auto) 0 % (0-3)    0 % (0-3) 


 


Neutrophils # (Auto)


  18.6 x10^3uL


(1.8-7.7) 


  


  19.1 x10^3uL


(1.8-7.7)


 


Lymphocytes # (Auto)


  4.2 x10^3/uL


(1.0-4.8) 


  


  1.9 x10^3/uL


(1.0-4.8)


 


Monocytes # (Auto)


  2.0 x10^3/uL


(0.0-1.1) 


  


  1.6 x10^3/uL


(0.0-1.1)


 


Eosinophils # (Auto)


  0.1 x10^3/uL


(0.0-0.7) 


  


  0.0 x10^3/uL


(0.0-0.7)


 


Basophils # (Auto)


  0.1 x10^3/uL


(0.0-0.2) 


  


  0.1 x10^3/uL


(0.0-0.2)


 


Segmented Neutrophils % 78 % (35-66)    


 


Band Neutrophils % 1 % (0-9)    


 


Lymphocytes % 14 % (24-48)    


 


Monocytes % 6 % (0-10)    


 


Eosinophils % 1 % (0-5)    


 


Platelet Estimate


  Adequate


(ADEQUATE) 


  


  


 


 


Sodium Level


  


  139 mmol/L


(136-145) 


  


 


 


Potassium Level


  


  3.1 mmol/L


(3.5-5.1) 


  


 


 


Chloride Level


  


  100 mmol/L


() 


  


 


 


Carbon Dioxide Level


  


  24 mmol/L


(21-32) 


  


 


 


Anion Gap  15 (6-14)   


 


Blood Urea Nitrogen


  


  13 mg/dL


(8-26) 


  


 


 


Creatinine


  


  1.3 mg/dL


(0.7-1.3) 


  


 


 


Estimated GFR


(Cockcroft-Gault) 


  64.0 


  


  


 


 


Glucose Level


  


  151 mg/dL


(70-99) 


  


 


 


Calcium Level


  


  9.4 mg/dL


(8.5-10.1) 


  


 


 


Total Bilirubin


  


  0.2 mg/dL


(0.2-1.0) 


  


 


 


Direct Bilirubin


  


  0.1 mg/dL


(0.0-0.2) 


  


 


 


Aspartate Amino Transf


(AST/SGOT) 


  23 U/L (15-37) 


  


  


 


 


Alanine Aminotransferase


(ALT/SGPT) 


  40 U/L (16-63) 


  


  


 


 


Alkaline Phosphatase


  


  60 U/L


() 


  


 


 


Total Protein


  


  7.9 g/dL


(6.4-8.2) 


  


 


 


Albumin


  


  4.2 g/dL


(3.4-5.0) 


  


 


 


Lipase


  


  109 U/L


() 


  


 


 


Urine Collection Type   Unknown  


 


Urine Color   Yellow  


 


Urine Clarity   Clear  


 


Urine pH   8.5  


 


Urine Specific Gravity   1.020  


 


Urine Protein


  


  


  Negative mg/dL


(NEG-TRACE) 


 


 


Urine Glucose (UA)


  


  


  Negative mg/dL


(NEG) 


 


 


Urine Ketones (Stick)


  


  


  Trace mg/dL


(NEG) 


 


 


Urine Blood   Negative (NEG)  


 


Urine Nitrite   Negative (NEG)  


 


Urine Bilirubin   Negative (NEG)  


 


Urine Urobilinogen Dipstick


  


  


  0.2 mg/dL (0.2


mg/dL) 


 


 


Urine Leukocyte Esterase   Negative (NEG)  


 


Urine RBC   Occ /HPF (0-2)  


 


Urine WBC   Occ /HPF (0-4)  


 


Urine Squamous Epithelial


Cells 


  


  Occ /LPF 


  


 


 


Urine Bacteria   0 /HPF (0-FEW)  


 


Urine Mucus   Slight /LPF  


 


Magnesium Level


  


  


  


  1.8 mg/dL


(1.8-2.4)








Laboratory Tests








Test


  11/27/17


23:15 11/27/17


23:35 11/28/17


00:45 11/28/17


08:40


 


White Blood Count


  24.9 x10^3/uL


(4.0-11.0) 


  


  22.6 x10^3/uL


(4.0-11.0)


 


Red Blood Count


  5.12 x10^6/uL


(4.30-5.70) 


  


  4.75 x10^6/uL


(4.30-5.70)


 


Hemoglobin


  14.5 g/dL


(13.0-17.5) 


  


  13.5 g/dL


(13.0-17.5)


 


Hematocrit


  45.0 %


(39.0-53.0) 


  


  41.7 %


(39.0-53.0)


 


Mean Corpuscular Volume 88 fL ()    88 fL () 


 


Mean Corpuscular Hemoglobin 28 pg (25-35)    28 pg (25-35) 


 


Mean Corpuscular Hemoglobin


Concent 32 g/dL


(31-37) 


  


  32 g/dL


(31-37)


 


Red Cell Distribution Width


  13.6 %


(11.5-14.5) 


  


  13.8 %


(11.5-14.5)


 


Platelet Count


  333 x10^3/uL


(140-400) 


  


  312 x10^3/uL


(140-400)


 


Neutrophils (%) (Auto) 75 % (31-73)    84 % (31-73) 


 


Lymphocytes (%) (Auto) 17 % (24-48)    8 % (24-48) 


 


Monocytes (%) (Auto) 8 % (0-9)    7 % (0-9) 


 


Eosinophils (%) (Auto) 1 % (0-3)    0 % (0-3) 


 


Basophils (%) (Auto) 0 % (0-3)    0 % (0-3) 


 


Neutrophils # (Auto)


  18.6 x10^3uL


(1.8-7.7) 


  


  19.1 x10^3uL


(1.8-7.7)


 


Lymphocytes # (Auto)


  4.2 x10^3/uL


(1.0-4.8) 


  


  1.9 x10^3/uL


(1.0-4.8)


 


Monocytes # (Auto)


  2.0 x10^3/uL


(0.0-1.1) 


  


  1.6 x10^3/uL


(0.0-1.1)


 


Eosinophils # (Auto)


  0.1 x10^3/uL


(0.0-0.7) 


  


  0.0 x10^3/uL


(0.0-0.7)


 


Basophils # (Auto)


  0.1 x10^3/uL


(0.0-0.2) 


  


  0.1 x10^3/uL


(0.0-0.2)


 


Segmented Neutrophils % 78 % (35-66)    


 


Band Neutrophils % 1 % (0-9)    


 


Lymphocytes % 14 % (24-48)    


 


Monocytes % 6 % (0-10)    


 


Eosinophils % 1 % (0-5)    


 


Platelet Estimate


  Adequate


(ADEQUATE) 


  


  


 


 


Sodium Level


  


  139 mmol/L


(136-145) 


  


 


 


Potassium Level


  


  3.1 mmol/L


(3.5-5.1) 


  


 


 


Chloride Level


  


  100 mmol/L


() 


  


 


 


Carbon Dioxide Level


  


  24 mmol/L


(21-32) 


  


 


 


Anion Gap  15 (6-14)   


 


Blood Urea Nitrogen


  


  13 mg/dL


(8-26) 


  


 


 


Creatinine


  


  1.3 mg/dL


(0.7-1.3) 


  


 


 


Estimated GFR


(Cockcroft-Gault) 


  64.0 


  


  


 


 


Glucose Level


  


  151 mg/dL


(70-99) 


  


 


 


Calcium Level


  


  9.4 mg/dL


(8.5-10.1) 


  


 


 


Total Bilirubin


  


  0.2 mg/dL


(0.2-1.0) 


  


 


 


Direct Bilirubin


  


  0.1 mg/dL


(0.0-0.2) 


  


 


 


Aspartate Amino Transf


(AST/SGOT) 


  23 U/L (15-37) 


  


  


 


 


Alanine Aminotransferase


(ALT/SGPT) 


  40 U/L (16-63) 


  


  


 


 


Alkaline Phosphatase


  


  60 U/L


() 


  


 


 


Total Protein


  


  7.9 g/dL


(6.4-8.2) 


  


 


 


Albumin


  


  4.2 g/dL


(3.4-5.0) 


  


 


 


Lipase


  


  109 U/L


() 


  


 


 


Urine Collection Type   Unknown  


 


Urine Color   Yellow  


 


Urine Clarity   Clear  


 


Urine pH   8.5  


 


Urine Specific Gravity   1.020  


 


Urine Protein


  


  


  Negative mg/dL


(NEG-TRACE) 


 


 


Urine Glucose (UA)


  


  


  Negative mg/dL


(NEG) 


 


 


Urine Ketones (Stick)


  


  


  Trace mg/dL


(NEG) 


 


 


Urine Blood   Negative (NEG)  


 


Urine Nitrite   Negative (NEG)  


 


Urine Bilirubin   Negative (NEG)  


 


Urine Urobilinogen Dipstick


  


  


  0.2 mg/dL (0.2


mg/dL) 


 


 


Urine Leukocyte Esterase   Negative (NEG)  


 


Urine RBC   Occ /HPF (0-2)  


 


Urine WBC   Occ /HPF (0-4)  


 


Urine Squamous Epithelial


Cells 


  


  Occ /LPF 


  


 


 


Urine Bacteria   0 /HPF (0-FEW)  


 


Urine Mucus   Slight /LPF  


 


Magnesium Level


  


  


  


  1.8 mg/dL


(1.8-2.4)











Assessment/Plan


Assessment/Plan


32 year old male,  upper abdominal pain, elevated WBC.  The CT scan was 

reviewed and is equivocal for appendicitis.  Clinically however the patient 

never had RLQ pain, and currently is pain free.  His exam is without tenderness 

or guarding.  This would make appendicitis unlikely;  plan to repeat his WBC, 

serial exams, monitoring.  The reason for the elevated WBC remains unclear.











EITAN PA MD Nov 28, 2017 12:05

## 2017-11-29 VITALS — SYSTOLIC BLOOD PRESSURE: 104 MMHG | DIASTOLIC BLOOD PRESSURE: 65 MMHG

## 2017-11-29 VITALS — DIASTOLIC BLOOD PRESSURE: 73 MMHG | SYSTOLIC BLOOD PRESSURE: 127 MMHG

## 2017-11-29 VITALS — DIASTOLIC BLOOD PRESSURE: 73 MMHG | SYSTOLIC BLOOD PRESSURE: 126 MMHG

## 2017-11-29 LAB
ANION GAP SERPL CALC-SCNC: 5 MMOL/L (ref 6–14)
BASOPHILS # BLD AUTO: 0.1 X10^3/UL (ref 0–0.2)
BASOPHILS NFR BLD: 1 % (ref 0–3)
BUN SERPL-MCNC: 8 MG/DL (ref 8–26)
CALCIUM SERPL-MCNC: 8.5 MG/DL (ref 8.5–10.1)
CHLORIDE SERPL-SCNC: 106 MMOL/L (ref 98–107)
CO2 SERPL-SCNC: 29 MMOL/L (ref 21–32)
CREAT SERPL-MCNC: 1.1 MG/DL (ref 0.7–1.3)
EOSINOPHIL NFR BLD: 1 % (ref 0–3)
ERYTHROCYTE [DISTWIDTH] IN BLOOD BY AUTOMATED COUNT: 13.4 % (ref 11.5–14.5)
GFR SERPLBLD BASED ON 1.73 SQ M-ARVRAT: 77.6 ML/MIN
GLUCOSE SERPL-MCNC: 106 MG/DL (ref 70–99)
HCT VFR BLD CALC: 40 % (ref 39–53)
HGB BLD-MCNC: 12.9 G/DL (ref 13–17.5)
LYMPHOCYTES # BLD: 3.5 X10^3/UL (ref 1–4.8)
LYMPHOCYTES NFR BLD AUTO: 36 % (ref 24–48)
MCH RBC QN AUTO: 29 PG (ref 25–35)
MCHC RBC AUTO-ENTMCNC: 32 G/DL (ref 31–37)
MCV RBC AUTO: 89 FL (ref 79–100)
MONOCYTES NFR BLD: 12 % (ref 0–9)
NEUTROPHILS NFR BLD AUTO: 51 % (ref 31–73)
PLATELET # BLD AUTO: 268 X10^3/UL (ref 140–400)
POTASSIUM SERPL-SCNC: 3.7 MMOL/L (ref 3.5–5.1)
RBC # BLD AUTO: 4.5 X10^6/UL (ref 4.3–5.7)
SODIUM SERPL-SCNC: 140 MMOL/L (ref 136–145)
WBC # BLD AUTO: 9.9 X10^3/UL (ref 4–11)

## 2017-11-29 RX ADMIN — PIPERACILLIN SODIUM AND TAZOBACTAM SODIUM SCH GM: 3; .375 INJECTION, POWDER, FOR SOLUTION INTRAVENOUS at 00:18

## 2017-11-29 RX ADMIN — DEXTROSE, SODIUM CHLORIDE, AND POTASSIUM CHLORIDE SCH MLS/HR: 5; .45; .22 INJECTION INTRAVENOUS at 04:00

## 2017-11-29 RX ADMIN — PIPERACILLIN SODIUM AND TAZOBACTAM SODIUM SCH GM: 3; .375 INJECTION, POWDER, FOR SOLUTION INTRAVENOUS at 05:59

## 2018-05-21 ENCOUNTER — HOSPITAL ENCOUNTER (INPATIENT)
Dept: HOSPITAL 61 - ER | Age: 33
LOS: 1 days | Discharge: HOME | DRG: 341 | End: 2018-05-22
Attending: FAMILY MEDICINE | Admitting: FAMILY MEDICINE
Payer: SELF-PAY

## 2018-05-21 DIAGNOSIS — E87.2: ICD-10-CM

## 2018-05-21 DIAGNOSIS — N17.9: ICD-10-CM

## 2018-05-21 DIAGNOSIS — E87.6: ICD-10-CM

## 2018-05-21 DIAGNOSIS — K35.80: Primary | ICD-10-CM

## 2018-05-21 DIAGNOSIS — Z87.19: ICD-10-CM

## 2018-05-21 DIAGNOSIS — F12.10: ICD-10-CM

## 2018-05-21 DIAGNOSIS — R65.11: ICD-10-CM

## 2018-05-21 LAB
% BANDS: 3 % (ref 0–9)
% LYMPHS: 6 % (ref 24–48)
% MONOS: 9 % (ref 0–10)
% SEGS: 82 % (ref 35–66)
ADD MAN DIFF?: YES
ALBUMIN SERPL-MCNC: 4.2 G/DL (ref 3.4–5)
ALP SERPL-CCNC: 66 U/L (ref 46–116)
ALT (SGPT): 39 U/L (ref 16–63)
AMPHETAMINE/METHAMPHETAMINE: (no result)
ANION GAP SERPL CALC-SCNC: 20 MMOL/L (ref 6–14)
AST SERPL-CCNC: 20 U/L (ref 15–37)
BACTERIA #/AREA URNS HPF: (no result) /HPF
BARBITURATES: (no result)
BASO #: 0.1 X10^3/UL (ref 0–0.2)
BASO %: 0 % (ref 0–3)
BENZODIAZEPINES: (no result)
BILIRUB DIRECT SERPL-MCNC: 0.1 MG/DL (ref 0–0.2)
BILIRUBIN,URINE: NEGATIVE
BLOOD UREA NITROGEN: 15 MG/DL (ref 8–26)
CALCIUM: 9.4 MG/DL (ref 8.5–10.1)
CANNABINOIDS: (no result)
CHLORIDE: 100 MMOL/L (ref 98–107)
CLARITY,URINE: CLEAR
CO2 SERPL-SCNC: 19 MMOL/L (ref 21–32)
COCAINE: (no result)
COLOR,URINE: YELLOW
CREAT SERPL-MCNC: 1.3 MG/DL (ref 0.7–1.3)
DOHLE BODIES: PRESENT
EOS #: 0.1 X10^3/UL (ref 0–0.7)
EOS %: 0 % (ref 0–3)
ETHANOL, URINE: (no result)
GFR SERPLBLD BASED ON 1.73 SQ M-ARVRAT: 64 ML/MIN
GLUCOSE SERPL-MCNC: 185 MG/DL (ref 70–99)
GLUCOSE,URINE: NEGATIVE MG/DL
HCG SERPL-ACNC: 31.4 X10^3/UL (ref 4–11)
HEMATOCRIT: 42.6 % (ref 39–53)
HEMOGLOBIN: 14.1 G/DL (ref 13–17.5)
LIPASE: 120 U/L (ref 73–393)
LYMPH #: 3.2 X10^3/UL (ref 1–4.8)
LYMPH %: 10 % (ref 24–48)
MEAN CORPUSCULAR HEMOGLOBIN: 29 PG (ref 25–35)
MEAN CORPUSCULAR HGB CONC: 33 G/DL (ref 31–37)
MEAN CORPUSCULAR VOLUME: 87 FL (ref 79–100)
METHADONE: (no result)
MONO #: 2.3 X10^3/UL (ref 0–1.1)
MONO %: 7 % (ref 0–9)
NEUT #: 25.7 X10^3UL (ref 1.8–7.7)
NEUT %: 82 % (ref 31–73)
NITRITE UR QL STRIP: NEGATIVE
OPIATES: (no result)
PH,URINE: 6
PHENCYCLIDINE: (no result)
PLATELET COUNT: 332 X10^3/UL (ref 140–400)
PLT ESTIMATE: ADEQUATE
POTASSIUM SERPL-SCNC: 2.9 MMOL/L (ref 3.5–5.1)
PROTEIN,URINE: NEGATIVE MG/DL
RBC,URINE: (no result) /HPF (ref 0–2)
RED BLOOD COUNT: 4.9 X10^6/UL (ref 4.3–5.7)
RED CELL DISTRIBUTION WIDTH: 13.7 % (ref 11.5–14.5)
SODIUM: 139 MMOL/L (ref 136–145)
SPECIFIC GRAVITY,URINE: 1.02
SQUAMOUS EPITHELIAL CELL,UR: (no result) /LPF
TOTAL BILIRUBIN: 0.5 MG/DL (ref 0.2–1)
TOTAL PROTEIN: 8.4 G/DL (ref 6.4–8.2)
UROBILINOGEN,URINE: 0.2 MG/DL
WBC #/AREA URNS HPF: (no result) /HPF (ref 0–4)

## 2018-05-21 PROCEDURE — 80048 BASIC METABOLIC PNL TOTAL CA: CPT

## 2018-05-21 PROCEDURE — 87040 BLOOD CULTURE FOR BACTERIA: CPT

## 2018-05-21 PROCEDURE — 96374 THER/PROPH/DIAG INJ IV PUSH: CPT

## 2018-05-21 PROCEDURE — 96376 TX/PRO/DX INJ SAME DRUG ADON: CPT

## 2018-05-21 PROCEDURE — 80053 COMPREHEN METABOLIC PANEL: CPT

## 2018-05-21 PROCEDURE — 81001 URINALYSIS AUTO W/SCOPE: CPT

## 2018-05-21 PROCEDURE — 36415 COLL VENOUS BLD VENIPUNCTURE: CPT

## 2018-05-21 PROCEDURE — 88304 TISSUE EXAM BY PATHOLOGIST: CPT

## 2018-05-21 PROCEDURE — 83690 ASSAY OF LIPASE: CPT

## 2018-05-21 PROCEDURE — 96375 TX/PRO/DX INJ NEW DRUG ADDON: CPT

## 2018-05-21 PROCEDURE — 99285 EMERGENCY DEPT VISIT HI MDM: CPT

## 2018-05-21 PROCEDURE — 80307 DRUG TEST PRSMV CHEM ANLYZR: CPT

## 2018-05-21 PROCEDURE — 85025 COMPLETE CBC W/AUTO DIFF WBC: CPT

## 2018-05-21 PROCEDURE — 85007 BL SMEAR W/DIFF WBC COUNT: CPT

## 2018-05-21 PROCEDURE — 80076 HEPATIC FUNCTION PANEL: CPT

## 2018-05-21 PROCEDURE — 74177 CT ABD & PELVIS W/CONTRAST: CPT

## 2018-05-21 PROCEDURE — 0DTJ4ZZ RESECTION OF APPENDIX, PERCUTANEOUS ENDOSCOPIC APPROACH: ICD-10-PCS

## 2018-05-21 RX ADMIN — ONDANSETRON 1 MG: 2 INJECTION INTRAMUSCULAR; INTRAVENOUS at 05:31

## 2018-05-21 RX ADMIN — FENTANYL CITRATE 1 MCG: 50 INJECTION INTRAMUSCULAR; INTRAVENOUS at 05:57

## 2018-05-21 RX ADMIN — SODIUM CHLORIDE, SODIUM LACTATE, POTASSIUM CHLORIDE, AND CALCIUM CHLORIDE 1 MLS/HR: .6; .31; .03; .02 INJECTION, SOLUTION INTRAVENOUS at 14:52

## 2018-05-21 RX ADMIN — DEXTROSE, SODIUM CHLORIDE, AND POTASSIUM CHLORIDE 1 MLS/HR: 5; .45; .15 INJECTION INTRAVENOUS at 09:23

## 2018-05-21 RX ADMIN — BACITRACIN 1 MLS/HR: 5000 INJECTION, POWDER, FOR SOLUTION INTRAMUSCULAR at 07:17

## 2018-05-21 RX ADMIN — KETOROLAC TROMETHAMINE 1 MG: 15 INJECTION, SOLUTION INTRAMUSCULAR; INTRAVENOUS at 14:51

## 2018-05-21 RX ADMIN — HYDROCODONE BITARTRATE AND ACETAMINOPHEN 1 TAB: 5; 325 TABLET ORAL at 23:31

## 2018-05-21 RX ADMIN — DOCUSATE SODIUM 1 MG: 100 CAPSULE, LIQUID FILLED ORAL at 21:07

## 2018-05-21 RX ADMIN — HYDROCODONE BITARTRATE AND ACETAMINOPHEN 1 TAB: 5; 325 TABLET ORAL at 19:30

## 2018-05-21 RX ADMIN — PIPERACILLIN SODIUM AND TAZOBACTAM SODIUM 1 MLS/HR: 3; .375 INJECTION, POWDER, LYOPHILIZED, FOR SOLUTION INTRAVENOUS at 23:31

## 2018-05-21 RX ADMIN — MORPHINE SULFATE 1 MG: 4 INJECTION, SOLUTION INTRAMUSCULAR; INTRAVENOUS at 09:56

## 2018-05-21 RX ADMIN — BACITRACIN 1 MLS/HR: 5000 INJECTION, POWDER, FOR SOLUTION INTRAMUSCULAR at 05:32

## 2018-05-21 RX ADMIN — FENTANYL CITRATE 1 MCG: 50 INJECTION INTRAMUSCULAR; INTRAVENOUS at 06:41

## 2018-05-21 RX ADMIN — FENTANYL CITRATE 1 MCG: 50 INJECTION INTRAMUSCULAR; INTRAVENOUS at 10:41

## 2018-05-21 RX ADMIN — PIPERACILLIN SODIUM AND TAZOBACTAM SODIUM 1 MLS/HR: 3; .375 INJECTION, POWDER, LYOPHILIZED, FOR SOLUTION INTRAVENOUS at 14:52

## 2018-05-21 RX ADMIN — PIPERACILLIN SODIUM AND TAZOBACTAM SODIUM 1 MLS/HR: 3; .375 INJECTION, POWDER, LYOPHILIZED, FOR SOLUTION INTRAVENOUS at 07:26

## 2018-05-21 RX ADMIN — BUPIVACAINE HYDROCHLORIDE AND EPINEPHRINE BITARTRATE 1 ML: 2.5; .005 INJECTION, SOLUTION INFILTRATION; PERINEURAL at 12:20

## 2018-05-21 RX ADMIN — FENTANYL CITRATE 1 MCG: 50 INJECTION INTRAMUSCULAR; INTRAVENOUS at 05:32

## 2018-05-21 RX ADMIN — SODIUM CHLORIDE, SODIUM LACTATE, POTASSIUM CHLORIDE, AND CALCIUM CHLORIDE 1 MLS/HR: .6; .31; .03; .02 INJECTION, SOLUTION INTRAVENOUS at 10:48

## 2018-05-21 RX ADMIN — SODIUM CHLORIDE, SODIUM LACTATE, POTASSIUM CHLORIDE, AND CALCIUM CHLORIDE 1 MLS/HR: .6; .31; .03; .02 INJECTION, SOLUTION INTRAVENOUS at 12:54

## 2018-05-21 RX ADMIN — ONDANSETRON 1 MG: 2 INJECTION INTRAMUSCULAR; INTRAVENOUS at 08:50

## 2018-05-21 RX ADMIN — MORPHINE SULFATE 1 MG: 10 INJECTION INTRAMUSCULAR; INTRAVENOUS; SUBCUTANEOUS at 07:37

## 2018-05-21 RX ADMIN — IOHEXOL 1 ML: 300 INJECTION, SOLUTION INTRAVENOUS at 06:47

## 2018-05-21 RX ADMIN — SODIUM CHLORIDE, SODIUM LACTATE, POTASSIUM CHLORIDE, AND CALCIUM CHLORIDE 1 MLS/HR: .6; .31; .03; .02 INJECTION, SOLUTION INTRAVENOUS at 22:54

## 2018-05-21 RX ADMIN — FENTANYL CITRATE 1 MCG: 50 INJECTION INTRAMUSCULAR; INTRAVENOUS at 09:19

## 2018-05-21 RX ADMIN — HYDROCODONE BITARTRATE AND ACETAMINOPHEN 1 TAB: 5; 325 TABLET ORAL at 14:51

## 2018-05-22 LAB
ADD MAN DIFF?: NO
ALBUMIN SERPL-MCNC: 3.3 G/DL (ref 3.4–5)
ALBUMIN/GLOB SERPL: 0.8 {RATIO} (ref 1–1.7)
ALP SERPL-CCNC: 56 U/L (ref 46–116)
ALT (SGPT): 30 U/L (ref 16–63)
ANION GAP SERPL CALC-SCNC: 7 MMOL/L (ref 6–14)
AST SERPL-CCNC: 13 U/L (ref 15–37)
BASO #: 0 X10^3/UL (ref 0–0.2)
BASO %: 0 % (ref 0–3)
BLOOD UREA NITROGEN: 7 MG/DL (ref 8–26)
BUN/CREAT SERPL: 7 (ref 6–20)
CALCIUM: 8.3 MG/DL (ref 8.5–10.1)
CHLORIDE: 106 MMOL/L (ref 98–107)
CO2 SERPL-SCNC: 28 MMOL/L (ref 21–32)
CREAT SERPL-MCNC: 1 MG/DL (ref 0.7–1.3)
EOS #: 0 X10^3/UL (ref 0–0.7)
EOS %: 0 % (ref 0–3)
GFR SERPLBLD BASED ON 1.73 SQ M-ARVRAT: 86.6 ML/MIN
GLOBULIN SER-MCNC: 3.9 G/DL (ref 2.2–3.8)
GLUCOSE SERPL-MCNC: 126 MG/DL (ref 70–99)
HCG SERPL-ACNC: 17.4 X10^3/UL (ref 4–11)
HEMATOCRIT: 39.8 % (ref 39–53)
HEMOGLOBIN: 13.2 G/DL (ref 13–17.5)
LYMPH #: 2.2 X10^3/UL (ref 1–4.8)
LYMPH %: 13 % (ref 24–48)
MEAN CORPUSCULAR HEMOGLOBIN: 29 PG (ref 25–35)
MEAN CORPUSCULAR HGB CONC: 33 G/DL (ref 31–37)
MEAN CORPUSCULAR VOLUME: 88 FL (ref 79–100)
MONO #: 1.8 X10^3/UL (ref 0–1.1)
MONO %: 11 % (ref 0–9)
NEUT #: 13.4 X10^3UL (ref 1.8–7.7)
NEUT %: 77 % (ref 31–73)
PLATELET COUNT: 273 X10^3/UL (ref 140–400)
POTASSIUM SERPL-SCNC: 4 MMOL/L (ref 3.5–5.1)
RED BLOOD COUNT: 4.55 X10^6/UL (ref 4.3–5.7)
RED CELL DISTRIBUTION WIDTH: 13.7 % (ref 11.5–14.5)
SODIUM: 141 MMOL/L (ref 136–145)
TOTAL BILIRUBIN: 0.5 MG/DL (ref 0.2–1)
TOTAL PROTEIN: 7.2 G/DL (ref 6.4–8.2)

## 2018-05-22 RX ADMIN — PIPERACILLIN SODIUM AND TAZOBACTAM SODIUM 1 MLS/HR: 3; .375 INJECTION, POWDER, LYOPHILIZED, FOR SOLUTION INTRAVENOUS at 12:00

## 2018-05-22 RX ADMIN — SODIUM CHLORIDE, SODIUM LACTATE, POTASSIUM CHLORIDE, AND CALCIUM CHLORIDE 1 MLS/HR: .6; .31; .03; .02 INJECTION, SOLUTION INTRAVENOUS at 08:29

## 2018-05-22 RX ADMIN — KETOROLAC TROMETHAMINE 1 MG: 15 INJECTION, SOLUTION INTRAMUSCULAR; INTRAVENOUS at 09:57

## 2018-05-22 RX ADMIN — HYDROCODONE BITARTRATE AND ACETAMINOPHEN 1 TAB: 5; 325 TABLET ORAL at 09:56

## 2018-05-22 RX ADMIN — Medication 1 CAP: at 10:01

## 2018-05-22 RX ADMIN — PIPERACILLIN SODIUM AND TAZOBACTAM SODIUM 1 MLS/HR: 3; .375 INJECTION, POWDER, LYOPHILIZED, FOR SOLUTION INTRAVENOUS at 06:02

## 2018-05-22 RX ADMIN — HYDROCODONE BITARTRATE AND ACETAMINOPHEN 1 TAB: 5; 325 TABLET ORAL at 06:03

## 2018-05-22 RX ADMIN — DOCUSATE SODIUM 1 MG: 100 CAPSULE, LIQUID FILLED ORAL at 08:31
